# Patient Record
(demographics unavailable — no encounter records)

---

## 2024-12-18 NOTE — PHYSICAL EXAM
[de-identified] :  physical exam of his right hand: He has well-demarcated area of swelling at the base of the fifth metacarpal on the dorsal side of the hand.  It is firm and mobile but also a little diffuse.  Point tenderness over the area.  He can make a full fist.  No extensor lag present.  Full range of motion.  Sensory and motor are intact.

## 2024-12-18 NOTE — HISTORY OF PRESENT ILLNESS
[de-identified] : Patient is an 56-year-old male here for evaluation of his right hand.  He states that in September 2024, he sustained a laceration on the dorsal side of the forearm just proximal to the wrist joint.  After that healed, he tripped and fell with a closed fist into a door.  He did not get evaluated after that.  A few weeks later he noticed a bump at the base of the fifth metacarpal.  It causes some pain with range of motion and physical activities.  He noticed that it has gotten bigger.

## 2024-12-18 NOTE — DATA REVIEWED
[FreeTextEntry1] :  3 x-ray views taken in the office today of his right hand show no acute or healed displaced fractures or bony abnormalities.

## 2024-12-18 NOTE — DISCUSSION/SUMMARY
[de-identified] : Patient had a right hand injury over a month ago.  I do not see any subacute or healed fractures on the x-ray.  He has been doing home exercises without any relief.  I recommend an MRI to evaluate the soft tissue swelling and differentiate between a ganglion cyst versus extensor tenosynovitis.  He will contact the office a few days after the MRI for the results.  I gave him a cock up wrist brace for immobilization and support.  He will continue taking anti-inflammatories.  Follow-up in a month with Dr. Arevalo for repeat evaluation.  All questions were answered today.

## 2025-01-24 NOTE — ASSESSMENT
[FreeTextEntry1] : the patient probably sustained some injury to the ECU tendon.  That original injury may have been a laceration of the ECU tendon.  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  The possibility of surgical excision of this area was discussed.  It would not be for ECU tendon reconstruction.  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  He will see me back on an as-needed basis.

## 2025-01-24 NOTE — HISTORY OF PRESENT ILLNESS
[de-identified] : 56-year-old male had an injury to his right wrist area.  The first was a laceration.  The second was when he banged it into a wall.  He has a swelling dorsal dorsal ulnar aspect of his hand.  Comes in today for evaluation.  He has some pain and discomfort sometimes when he is lifting things he has pain and discomfort.

## 2025-01-24 NOTE — PHYSICAL EXAM
[de-identified] : Patient does have swelling dorsal dorsal ulnar aspect of his right hand.  It is around the insertion site of the extensor tendon.  There is a well-healed laceration proximal to the ulnar head.  There is good range of motion to the fingers.  He can ulnar deviate.  However when he does ulnar deviate there is no palpable ECU present just distal to the ulnar head.

## 2025-01-24 NOTE — DATA REVIEWED
[FreeTextEntry1] : Regular x-rays from previous visit are reviewed showing no fracture dislocation no destructive lesions  MRI report is reviewed documenting abnormality around the ECU insertion site and proximal